# Patient Record
Sex: FEMALE | Race: BLACK OR AFRICAN AMERICAN | NOT HISPANIC OR LATINO | Employment: UNEMPLOYED | URBAN - METROPOLITAN AREA
[De-identification: names, ages, dates, MRNs, and addresses within clinical notes are randomized per-mention and may not be internally consistent; named-entity substitution may affect disease eponyms.]

---

## 2021-07-10 ENCOUNTER — HOSPITAL ENCOUNTER (EMERGENCY)
Age: 26
Discharge: HOME | End: 2021-07-10
Attending: EMERGENCY MEDICINE

## 2021-07-10 ENCOUNTER — APPOINTMENT (OUTPATIENT)
Dept: GENERAL RADIOLOGY | Age: 26
End: 2021-07-10
Attending: EMERGENCY MEDICINE

## 2021-07-10 VITALS
OXYGEN SATURATION: 100 % | WEIGHT: 121.25 LBS | SYSTOLIC BLOOD PRESSURE: 122 MMHG | DIASTOLIC BLOOD PRESSURE: 83 MMHG | RESPIRATION RATE: 16 BRPM | BODY MASS INDEX: 17.96 KG/M2 | HEIGHT: 69 IN | TEMPERATURE: 98.4 F | HEART RATE: 83 BPM
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
## 2021-07-10 PROCEDURE — 72100 X-RAY EXAM L-S SPINE 2/3 VWS: CPT

## 2021-07-10 PROCEDURE — 99283 EMERGENCY DEPT VISIT LOW MDM: CPT

## 2021-07-10 PROCEDURE — 10004651 HB RX, NO CHARGE ITEM: Performed by: EMERGENCY MEDICINE

## 2021-07-10 PROCEDURE — 99283 EMERGENCY DEPT VISIT LOW MDM: CPT | Performed by: EMERGENCY MEDICINE

## 2021-07-10 RX ORDER — ACETAMINOPHEN 325 MG/1
975 TABLET ORAL ONCE
Status: COMPLETED | OUTPATIENT
Start: 2021-07-10 | End: 2021-07-10

## 2021-07-10 RX ADMIN — ACETAMINOPHEN 975 MG: 325 TABLET ORAL at 11:36

## 2021-07-10 ASSESSMENT — ENCOUNTER SYMPTOMS
COUGH: 0
SHORTNESS OF BREATH: 0
NAUSEA: 0
FEVER: 0
RHINORRHEA: 0
ABDOMINAL PAIN: 0
HEADACHES: 0
CHILLS: 0
LIGHT-HEADEDNESS: 0
DIARRHEA: 0
EYE PAIN: 0
FATIGUE: 0
CONSTIPATION: 0
BACK PAIN: 1
VOMITING: 0
DIZZINESS: 0
NUMBNESS: 0
SORE THROAT: 0
WEAKNESS: 0

## 2021-07-10 ASSESSMENT — PAIN DESCRIPTION - PAIN TYPE
TYPE: ACUTE PAIN

## 2021-07-10 ASSESSMENT — PAIN SCALES - GENERAL
PAINLEVEL_OUTOF10: 6
PAINLEVEL_OUTOF10: 5
PAINLEVEL_OUTOF10: 6
PAINLEVEL_OUTOF10: 5

## 2024-08-20 VITALS
WEIGHT: 134 LBS | OXYGEN SATURATION: 100 % | SYSTOLIC BLOOD PRESSURE: 148 MMHG | RESPIRATION RATE: 12 BRPM | BODY MASS INDEX: 18.76 KG/M2 | HEIGHT: 71 IN | HEART RATE: 98 BPM | DIASTOLIC BLOOD PRESSURE: 87 MMHG | TEMPERATURE: 98 F

## 2024-08-21 ENCOUNTER — HOSPITAL ENCOUNTER (EMERGENCY)
Facility: HOSPITAL | Age: 29
Discharge: LEFT BEFORE DISCHARGE | End: 2024-08-21

## 2024-08-21 NOTE — ED INITIAL ASSESSMENT (HPI)
Patient states she was driving home from school last week and developed left side of body numbness and panic attack. Happened again today when leaving school. Ambulatory. Very anxious in triage.

## 2024-08-24 ENCOUNTER — HOSPITAL ENCOUNTER (EMERGENCY)
Facility: HOSPITAL | Age: 29
Discharge: HOME | End: 2024-08-24

## 2024-08-24 ENCOUNTER — APPOINTMENT (OUTPATIENT)
Dept: GENERAL RADIOLOGY | Facility: HOSPITAL | Age: 29
End: 2024-08-24

## 2024-08-24 VITALS
WEIGHT: 130 LBS | HEIGHT: 71 IN | BODY MASS INDEX: 18.2 KG/M2 | OXYGEN SATURATION: 99 % | HEART RATE: 96 BPM | RESPIRATION RATE: 18 BRPM | DIASTOLIC BLOOD PRESSURE: 74 MMHG | TEMPERATURE: 97 F | SYSTOLIC BLOOD PRESSURE: 117 MMHG

## 2024-08-24 PROCEDURE — 93005 ELECTROCARDIOGRAM TRACING: CPT

## 2024-08-25 LAB
ATRIAL RATE: 92 BPM
P AXIS: 77 DEGREES
P-R INTERVAL: 136 MS
Q-T INTERVAL: 352 MS
QRS DURATION: 76 MS
QTC CALCULATION (BEZET): 435 MS
R AXIS: 45 DEGREES
T AXIS: 23 DEGREES
VENTRICULAR RATE: 92 BPM

## 2024-08-25 NOTE — ED INITIAL ASSESSMENT (HPI)
Patient with cp that radiates to left arm x4 days. Denies injury, patient also reports dizziness and nausea while waiting for assessment. Denies CHONG or fevers. Patient ambulatory to assessment with steady gait.

## 2025-03-29 ENCOUNTER — HOSPITAL ENCOUNTER (EMERGENCY)
Facility: HOSPITAL | Age: 30
Discharge: HOME | End: 2025-03-29
Attending: EMERGENCY MEDICINE

## 2025-03-29 VITALS
SYSTOLIC BLOOD PRESSURE: 123 MMHG | TEMPERATURE: 98 F | RESPIRATION RATE: 18 BRPM | DIASTOLIC BLOOD PRESSURE: 83 MMHG | OXYGEN SATURATION: 97 % | HEART RATE: 97 BPM

## 2025-03-29 DIAGNOSIS — R42 DIZZINESS: Primary | ICD-10-CM

## 2025-03-29 LAB
ALBUMIN SERPL-MCNC: 4.7 G/DL (ref 3.2–4.8)
ALP LIVER SERPL-CCNC: 72 U/L
ALT SERPL-CCNC: 14 U/L
ANION GAP SERPL CALC-SCNC: 8 MMOL/L (ref 0–18)
AST SERPL-CCNC: 19 U/L (ref ?–34)
B-HCG UR QL: NEGATIVE
BASOPHILS # BLD AUTO: 0.03 X10(3) UL (ref 0–0.2)
BASOPHILS NFR BLD AUTO: 0.6 %
BILIRUB DIRECT SERPL-MCNC: 0.2 MG/DL (ref ?–0.3)
BILIRUB SERPL-MCNC: 0.5 MG/DL (ref 0.3–1.2)
BUN BLD-MCNC: 13 MG/DL (ref 9–23)
BUN/CREAT SERPL: 14.3 (ref 10–20)
CALCIUM BLD-MCNC: 9.7 MG/DL (ref 8.7–10.4)
CHLORIDE SERPL-SCNC: 105 MMOL/L (ref 98–112)
CO2 SERPL-SCNC: 26 MMOL/L (ref 21–32)
CREAT BLD-MCNC: 0.91 MG/DL
DEPRECATED RDW RBC AUTO: 38.6 FL (ref 35.1–46.3)
EGFRCR SERPLBLD CKD-EPI 2021: 89 ML/MIN/1.73M2 (ref 60–?)
EOSINOPHIL # BLD AUTO: 0.12 X10(3) UL (ref 0–0.7)
EOSINOPHIL NFR BLD AUTO: 2.5 %
ERYTHROCYTE [DISTWIDTH] IN BLOOD BY AUTOMATED COUNT: 11.9 % (ref 11–15)
FLUAV + FLUBV RNA SPEC NAA+PROBE: NEGATIVE
FLUAV + FLUBV RNA SPEC NAA+PROBE: NEGATIVE
GLUCOSE BLD-MCNC: 94 MG/DL (ref 70–99)
HCT VFR BLD AUTO: 38.8 %
HGB BLD-MCNC: 13.1 G/DL
IMM GRANULOCYTES # BLD AUTO: 0.01 X10(3) UL (ref 0–1)
IMM GRANULOCYTES NFR BLD: 0.2 %
LYMPHOCYTES # BLD AUTO: 1.53 X10(3) UL (ref 1–4)
LYMPHOCYTES NFR BLD AUTO: 31.7 %
MCH RBC QN AUTO: 30.2 PG (ref 26–34)
MCHC RBC AUTO-ENTMCNC: 33.8 G/DL (ref 31–37)
MCV RBC AUTO: 89.4 FL
MONOCYTES # BLD AUTO: 0.33 X10(3) UL (ref 0.1–1)
MONOCYTES NFR BLD AUTO: 6.8 %
NEUTROPHILS # BLD AUTO: 2.8 X10 (3) UL (ref 1.5–7.7)
NEUTROPHILS # BLD AUTO: 2.8 X10(3) UL (ref 1.5–7.7)
NEUTROPHILS NFR BLD AUTO: 58.2 %
OSMOLALITY SERPL CALC.SUM OF ELEC: 288 MOSM/KG (ref 275–295)
PLATELET # BLD AUTO: 206 10(3)UL (ref 150–450)
POTASSIUM SERPL-SCNC: 3.9 MMOL/L (ref 3.5–5.1)
PROT SERPL-MCNC: 7.7 G/DL (ref 5.7–8.2)
RBC # BLD AUTO: 4.34 X10(6)UL
RSV RNA SPEC NAA+PROBE: NEGATIVE
SARS-COV-2 RNA RESP QL NAA+PROBE: NOT DETECTED
SODIUM SERPL-SCNC: 139 MMOL/L (ref 136–145)
TSI SER-ACNC: 1.51 UIU/ML (ref 0.55–4.78)
WBC # BLD AUTO: 4.8 X10(3) UL (ref 4–11)

## 2025-03-29 PROCEDURE — 36415 COLL VENOUS BLD VENIPUNCTURE: CPT

## 2025-03-29 PROCEDURE — 84443 ASSAY THYROID STIM HORMONE: CPT | Performed by: EMERGENCY MEDICINE

## 2025-03-29 PROCEDURE — 81025 URINE PREGNANCY TEST: CPT

## 2025-03-29 PROCEDURE — 87430 STREP A AG IA: CPT | Performed by: EMERGENCY MEDICINE

## 2025-03-29 PROCEDURE — 99284 EMERGENCY DEPT VISIT MOD MDM: CPT

## 2025-03-29 PROCEDURE — 80076 HEPATIC FUNCTION PANEL: CPT | Performed by: EMERGENCY MEDICINE

## 2025-03-29 PROCEDURE — 87430 STREP A AG IA: CPT

## 2025-03-29 PROCEDURE — 80048 BASIC METABOLIC PNL TOTAL CA: CPT | Performed by: EMERGENCY MEDICINE

## 2025-03-29 PROCEDURE — 0241U SARS-COV-2/FLU A AND B/RSV BY PCR (GENEXPERT): CPT

## 2025-03-29 PROCEDURE — 99283 EMERGENCY DEPT VISIT LOW MDM: CPT

## 2025-03-29 PROCEDURE — 0241U SARS-COV-2/FLU A AND B/RSV BY PCR (GENEXPERT): CPT | Performed by: EMERGENCY MEDICINE

## 2025-03-29 PROCEDURE — 85025 COMPLETE CBC W/AUTO DIFF WBC: CPT | Performed by: EMERGENCY MEDICINE

## 2025-03-29 RX ORDER — MECLIZINE HYDROCHLORIDE 25 MG/1
25 TABLET ORAL 3 TIMES DAILY PRN
Qty: 15 TABLET | Refills: 0 | Status: SHIPPED | OUTPATIENT
Start: 2025-03-29 | End: 2025-04-03

## 2025-03-29 RX ORDER — MECLIZINE HYDROCHLORIDE 25 MG/1
25 TABLET ORAL ONCE
Status: COMPLETED | OUTPATIENT
Start: 2025-03-29 | End: 2025-03-29

## 2025-03-29 NOTE — ED INITIAL ASSESSMENT (HPI)
Pt to ED for c/o bilateral ear fullness with pain, sore throat pain, and dizziness since Wednesday.

## 2025-03-29 NOTE — ED PROVIDER NOTES
Patient Seen in: [facility] Emergency Department      History   No chief complaint on file.    Stated Complaint: Sore throat    Subjective:   29-year-old female with depression who had a normal menstrual cycle last week states for 3 days she is giving me a chief complaint of dizziness which she describes as things moving and making her unsteady.  This even gets bad when she is driving or standing.  She also has fullness in her bilateral ears.  She also mention when she yawns she feels throat pain but has no constant throat pain.  She denies URI symptoms such as runny nose and cough.  No fever or chills.  No headache.  No focal weakness or numbness.  No vomiting or diarrhea.  No nausea.  No dysuria.              Objective:     Past Medical History:    Depression              History reviewed. No pertinent surgical history.             Social History     Socioeconomic History    Marital status: Single   Tobacco Use    Smoking status: Never    Smokeless tobacco: Never   Vaping Use    Vaping status: Never Used   Substance and Sexual Activity    Alcohol use: Not Currently    Drug use: Never                  Physical Exam     ED Triage Vitals [03/29/25 1250]   /83   Pulse 97   Resp 18   Temp 98.3 °F (36.8 °C)   Temp src Oral   SpO2 97 %   O2 Device None (Room air)       Current Vitals:   Vital Signs  BP: 123/83  Pulse: 97  Resp: 18  Temp: 98.3 °F (36.8 °C)  Temp src: Oral    Oxygen Therapy  SpO2: 97 %  O2 Device: None (Room air)        Physical Exam  HENT:      Head: Normocephalic.      Right Ear: Tympanic membrane normal.      Left Ear: Tympanic membrane normal.      Nose: Nose normal.      Mouth/Throat:      Mouth: Mucous membranes are moist.      Pharynx: Oropharynx is clear.   Eyes:      Extraocular Movements: Extraocular movements intact.      Pupils: Pupils are equal, round, and reactive to light.   Cardiovascular:      Rate and Rhythm: Normal rate and regular rhythm.      Pulses: Normal pulses.       Heart sounds: Normal heart sounds.   Pulmonary:      Effort: Pulmonary effort is normal.      Breath sounds: Normal breath sounds.   Abdominal:      Palpations: Abdomen is soft.      Tenderness: There is no abdominal tenderness.   Musculoskeletal:         General: No swelling or tenderness. Normal range of motion.      Cervical back: Normal range of motion.   Lymphadenopathy:      Cervical: No cervical adenopathy.   Skin:     General: Skin is warm.      Capillary Refill: Capillary refill takes less than 2 seconds.   Neurological:      General: No focal deficit present.      Mental Status: She is alert and oriented to person, place, and time.      Cranial Nerves: No cranial nerve deficit.      Sensory: No sensory deficit.      Motor: No weakness.      Coordination: Coordination normal.      Gait: Gait normal.             ED Course     Labs Reviewed   HEPATIC FUNCTION PANEL (7) - Normal   BASIC METABOLIC PANEL (8) - Normal   TSH W REFLEX TO FREE T4 - Normal   POCT PREGNANCY URINE - Normal   SARS-COV-2/FLU A AND B/RSV BY PCR (GENEXPERT) - Normal    Narrative:     This test is intended for the qualitative detection and differentiation of SARS-CoV-2, influenza A, influenza B, and respiratory syncytial virus (RSV) viral RNA in nasopharyngeal or nares swabs from individuals suspected of respiratory viral infection consistent with COVID-19 by their healthcare provider. Signs and symptoms of respiratory viral infection due to SARS-CoV-2, influenza, and RSV can be similar.    Test performed using the Xpert Xpress SARS-CoV-2/FLU/RSV (real time RT-PCR)  assay on the GeneXpert instrument, [employer], [place] [address].   This test is being used under the Food and Drug Administration's Emergency Use Authorization.    The authorized Fact Sheet for Healthcare Providers for this assay is available upon request from the laboratory.   RAPID STREP A SCREEN (LC) - Normal    Narrative:     A confirmatory culture is recommended if  clinically indicated.   CBC WITH DIFFERENTIAL WITH PLATELET   BLOOD CULTURE       ED Course as of 03/30/25 1752  ------------------------------------------------------------  Time: 03/29 1413  Comment: I went to see the patient to tell her her pregnancy test was negative and her GEN expert was negative and then she says she is now willing to do some blood work but still does not want to do the MRI.  Meclizine was given.  Labs independently interpreted by me.  Pregnancy negative, Genex but negative, rapid strep negative.  She looks comfortable and has normal vital signs at this time.              MDM      Patient refused imaging        Medical Decision Making  Patient here with weird sensation of things moving and even said spinning at 1 point.  She has no otitis media.  No headache or focal deficit.  I did recommend basic labs and MRI to rule out stroke.  She said she has to  her son and is not sure she wants to wait around for testing.  I did recommend at least a pregnancy test and try some meclizine and she can come back later if she changes her mind or gets worse.  She is alert and oriented and decisional and understands she is going against my recommendations.  I said this 2 times to make sure she understood this.  Rapid strep was negative.  Waiting for GEN expert.    Amount and/or Complexity of Data Reviewed  Labs: ordered. Decision-making details documented in ED Course.  Discussion of management or test interpretation with external provider(s): And felt better after meclizine.  Understands imaging was recommended and will come back if she changes her mind.  Rapid strep negative and she only had the pain with yawning.  Good follow-up was given.  All laboratory studies independently interpreted by me    Risk  Prescription drug management.        Disposition and Plan     Clinical Impression:  1. Dizziness         Disposition:  Discharge  3/29/2025  3:37 pm    Follow-up:  Carmita Wren MD  [address] [facility] [address]  [place] [address]  [phone]    Follow up      Jj Lisa MD  [address] [facility] [address]  [facility] [id number] [phone]    Follow up            Medications Prescribed:  Discharge Medication List as of 3/29/2025  3:40 PM        START taking these medications    Details   meclizine 25 MG Oral Tab Take 1 tablet (25 mg total) by mouth 3 (three) times daily as needed., Normal, Disp-15 tablet, R-0                 Supplementary Documentation:

## 2025-03-29 NOTE — DISCHARGE INSTRUCTIONS
If you change your mind about getting the MRI return to the ER immediately.  Otherwise follow-up with the neurology clinic.  Return for changes or worsening.  Take meclizine as needed.  Follow-up with neurology clinic.  Follow-up with the primary doctor or your own for reevaluation.

## 2025-03-31 ENCOUNTER — HOSPITAL ENCOUNTER (EMERGENCY)
Facility: HOSPITAL | Age: 30
Discharge: HOME | End: 2025-03-31
Attending: EMERGENCY MEDICINE

## 2025-03-31 VITALS
HEART RATE: 92 BPM | BODY MASS INDEX: 18 KG/M2 | SYSTOLIC BLOOD PRESSURE: 130 MMHG | TEMPERATURE: 98 F | OXYGEN SATURATION: 100 % | RESPIRATION RATE: 18 BRPM | WEIGHT: 130 LBS | DIASTOLIC BLOOD PRESSURE: 87 MMHG

## 2025-03-31 DIAGNOSIS — H83.02 LABYRINTHITIS OF LEFT EAR: Primary | ICD-10-CM

## 2025-03-31 DIAGNOSIS — H65.02 NON-RECURRENT ACUTE SEROUS OTITIS MEDIA OF LEFT EAR: ICD-10-CM

## 2025-03-31 PROCEDURE — 99283 EMERGENCY DEPT VISIT LOW MDM: CPT

## 2025-03-31 RX ORDER — MECLIZINE HYDROCHLORIDE 25 MG/1
25 TABLET ORAL 3 TIMES DAILY PRN
Qty: 20 TABLET | Refills: 0 | Status: SHIPPED | OUTPATIENT
Start: 2025-03-31

## 2025-03-31 RX ORDER — PSEUDOEPHEDRINE HCL 30 MG/1
60 TABLET, FILM COATED ORAL EVERY 6 HOURS PRN
Qty: 36 TABLET | Refills: 0 | Status: SHIPPED | OUTPATIENT
Start: 2025-03-31 | End: 2025-04-30

## 2025-03-31 NOTE — ED INITIAL ASSESSMENT (HPI)
29YF c/c of ear pain Pt state that she having bilateral; ear pain with the L ear pain, sore throat, a scab in her nose and dizziness

## 2025-03-31 NOTE — ED PROVIDER NOTES
Patient Seen in: [facility] Emergency Department      History     Chief Complaint   Patient presents with    Ear Problem Pain     Stated Complaint: L ear pain    Subjective:     HPI    29-year-old healthy woman reports dizziness since Wednesday, March 26, 2025, described as a spinning sensation similar to vertigo. Symptoms improve when grounded. No flu-like symptoms such as muscle or joint aches. No high blood pressure. Had a sinus infection a month ago, which has since resolved.    Objective:   Past Medical History:    Depression              History reviewed. No pertinent surgical history.             No pertinent social history.            Review of Systems    Positive for stated complaint: L ear pain  Other systems are as noted in HPI.  Constitutional and vital signs reviewed.      All other systems reviewed and negative except as noted above.    Physical Exam     ED Triage Vitals [03/31/25 1523]   /86   Pulse 96   Resp 18   Temp 98.1 °F (36.7 °C)   Temp src Temporal   SpO2 98 %   O2 Device None (Room air)       Current:/86   Pulse 92   Temp 98.1 °F (36.7 °C) (Temporal)   Resp 18   Wt 59 kg   LMP 03/21/2025   SpO2 100%   BMI 18.13 kg/m²     General:  Vitals as listed.  No acute distress   HEENT: Sclerae anicteric.  Conjunctivae show no pallor.  Oropharynx with minimal erythema, no tonsillar swelling, no exudate. mucous membranes moist.  EACs clear.  TMs dull in appearance without erythema  Lungs: good air exchange  Extremities: no edema, normal peripheral pulses   Neuro: Alert oriented and nonfocal    ED COURSE and MDM     To use Sudafed and Antivert as well as Tylenol/ibuprofen for discomfort    I have discussed with the patient the results of testing, differential diagnosis, and treatment plan. They expressed clear understanding of these instructions and agrees to the plan provided.    Disposition and Plan     Clinical Impression:  1. Labyrinthitis of left ear    2. Non-recurrent  acute serous otitis media of left ear         Disposition:  Discharge  3/31/2025  5:04 pm    Follow-up:  Shirley Molina MD  [address] [facility] [address]  [phone]    Follow up in 1 week(s)  As needed        Medications Prescribed:  Current Discharge Medication List        START taking these medications    Details   pseudoephedrine 30 MG Oral Tab Take 2 tablets (60 mg total) by mouth every 6 (six) hours as needed for congestion.  Qty: 36 tablet, Refills: 0    Associated Diagnoses: Labyrinthitis of left ear      !! meclizine 25 MG Oral Tab Take 1 tablet (25 mg total) by mouth 3 (three) times daily as needed.  Qty: 20 tablet, Refills: 0    Associated Diagnoses: Labyrinthitis of left ear       !! - Potential duplicate medications found. Please discuss with provider.

## 2025-03-31 NOTE — DISCHARGE INSTRUCTIONS
Take 1000 mg acetaminophen (2 Tylenol tablets) and/or 600 mg ibuprofen (3 Advil tablets) every 6 hours as needed

## 2025-04-05 ENCOUNTER — HOSPITAL ENCOUNTER (EMERGENCY)
Facility: HOSPITAL | Age: 30
Discharge: HOME | End: 2025-04-05
Attending: EMERGENCY MEDICINE

## 2025-04-05 ENCOUNTER — APPOINTMENT (OUTPATIENT)
Dept: CT IMAGING | Facility: HOSPITAL | Age: 30
End: 2025-04-05
Attending: EMERGENCY MEDICINE

## 2025-04-05 VITALS
RESPIRATION RATE: 16 BRPM | BODY MASS INDEX: 18.61 KG/M2 | OXYGEN SATURATION: 99 % | WEIGHT: 130 LBS | TEMPERATURE: 98 F | HEIGHT: 70 IN | DIASTOLIC BLOOD PRESSURE: 83 MMHG | SYSTOLIC BLOOD PRESSURE: 113 MMHG | HEART RATE: 76 BPM

## 2025-04-05 DIAGNOSIS — U07.1 COVID-19: Primary | ICD-10-CM

## 2025-04-05 DIAGNOSIS — H83.03 LABYRINTHITIS OF BOTH EARS: ICD-10-CM

## 2025-04-05 LAB
ALBUMIN SERPL-MCNC: 4.7 G/DL (ref 3.2–4.8)
ALBUMIN/GLOB SERPL: 1.6 {RATIO} (ref 1–2)
ALP LIVER SERPL-CCNC: 63 U/L
ALT SERPL-CCNC: 13 U/L
ANION GAP SERPL CALC-SCNC: 7 MMOL/L (ref 0–18)
AST SERPL-CCNC: 28 U/L (ref ?–34)
B-HCG UR QL: NEGATIVE
BASOPHILS # BLD AUTO: 0.02 X10(3) UL (ref 0–0.2)
BASOPHILS NFR BLD AUTO: 0.5 %
BILIRUB SERPL-MCNC: 0.4 MG/DL (ref 0.3–1.2)
BILIRUB UR QL STRIP.AUTO: NEGATIVE
BUN BLD-MCNC: 12 MG/DL (ref 9–23)
CALCIUM BLD-MCNC: 9.7 MG/DL (ref 8.7–10.6)
CHLORIDE SERPL-SCNC: 105 MMOL/L (ref 98–112)
CLARITY UR REFRACT.AUTO: CLEAR
CO2 SERPL-SCNC: 28 MMOL/L (ref 21–32)
COLOR UR AUTO: YELLOW
CREAT BLD-MCNC: 0.98 MG/DL
EGFRCR SERPLBLD CKD-EPI 2021: 81 ML/MIN/1.73M2 (ref 60–?)
EOSINOPHIL # BLD AUTO: 0.04 X10(3) UL (ref 0–0.7)
EOSINOPHIL NFR BLD AUTO: 1 %
ERYTHROCYTE [DISTWIDTH] IN BLOOD BY AUTOMATED COUNT: 11.7 %
FLUAV + FLUBV RNA SPEC NAA+PROBE: NEGATIVE
FLUAV + FLUBV RNA SPEC NAA+PROBE: NEGATIVE
GLOBULIN PLAS-MCNC: 3 G/DL (ref 2–3.5)
GLUCOSE BLD-MCNC: 91 MG/DL (ref 70–99)
GLUCOSE UR STRIP.AUTO-MCNC: NEGATIVE MG/DL
HCT VFR BLD AUTO: 37.2 %
HGB BLD-MCNC: 13.2 G/DL
IMM GRANULOCYTES # BLD AUTO: 0.01 X10(3) UL (ref 0–1)
IMM GRANULOCYTES NFR BLD: 0.2 %
KETONES UR STRIP.AUTO-MCNC: 15 MG/DL
LEUKOCYTE ESTERASE UR QL STRIP.AUTO: NEGATIVE
LYMPHOCYTES # BLD AUTO: 2.08 X10(3) UL (ref 1–4)
LYMPHOCYTES NFR BLD AUTO: 49.5 %
MCH RBC QN AUTO: 30.8 PG (ref 26–34)
MCHC RBC AUTO-ENTMCNC: 35.5 G/DL (ref 31–37)
MCV RBC AUTO: 86.7 FL
MONOCYTES # BLD AUTO: 0.43 X10(3) UL (ref 0.1–1)
MONOCYTES NFR BLD AUTO: 10.2 %
NEUTROPHILS # BLD AUTO: 1.62 X10 (3) UL (ref 1.5–7.7)
NEUTROPHILS # BLD AUTO: 1.62 X10(3) UL (ref 1.5–7.7)
NEUTROPHILS NFR BLD AUTO: 38.6 %
NITRITE UR QL STRIP.AUTO: NEGATIVE
OSMOLALITY SERPL CALC.SUM OF ELEC: 289 MOSM/KG (ref 275–295)
PH UR STRIP.AUTO: 7 [PH] (ref 5–8)
PLATELET # BLD AUTO: 171 10(3)UL (ref 150–450)
POTASSIUM SERPL-SCNC: 3.9 MMOL/L (ref 3.5–5.1)
PROT SERPL-MCNC: 7.7 G/DL (ref 5.7–8.2)
RBC # BLD AUTO: 4.29 X10(6)UL
RBC UR QL AUTO: NEGATIVE
RSV RNA SPEC NAA+PROBE: NEGATIVE
SARS-COV-2 RNA RESP QL NAA+PROBE: DETECTED
SODIUM SERPL-SCNC: 140 MMOL/L (ref 136–145)
SP GR UR STRIP.AUTO: 1.02 (ref 1–1.03)
UROBILINOGEN UR STRIP.AUTO-MCNC: 0.2 MG/DL
WBC # BLD AUTO: 4.2 X10(3) UL (ref 4–11)

## 2025-04-05 PROCEDURE — 81025 URINE PREGNANCY TEST: CPT

## 2025-04-05 PROCEDURE — 70450 CT HEAD/BRAIN W/O DYE: CPT | Performed by: EMERGENCY MEDICINE

## 2025-04-05 PROCEDURE — 85025 COMPLETE CBC W/AUTO DIFF WBC: CPT | Performed by: EMERGENCY MEDICINE

## 2025-04-05 PROCEDURE — 99284 EMERGENCY DEPT VISIT MOD MDM: CPT

## 2025-04-05 PROCEDURE — 81003 URINALYSIS AUTO W/O SCOPE: CPT | Performed by: EMERGENCY MEDICINE

## 2025-04-05 PROCEDURE — 96361 HYDRATE IV INFUSION ADD-ON: CPT

## 2025-04-05 PROCEDURE — 0241U SARS-COV-2/FLU A AND B/RSV BY PCR (GENEXPERT): CPT | Performed by: EMERGENCY MEDICINE

## 2025-04-05 PROCEDURE — 96374 THER/PROPH/DIAG INJ IV PUSH: CPT

## 2025-04-05 PROCEDURE — 80053 COMPREHEN METABOLIC PANEL: CPT | Performed by: EMERGENCY MEDICINE

## 2025-04-05 RX ORDER — PREDNISONE 20 MG/1
20 TABLET ORAL DAILY
Qty: 5 TABLET | Refills: 0 | Status: SHIPPED | OUTPATIENT
Start: 2025-04-05 | End: 2025-04-10

## 2025-04-05 RX ORDER — KETOROLAC TROMETHAMINE 15 MG/ML
15 INJECTION, SOLUTION INTRAMUSCULAR; INTRAVENOUS ONCE
Status: COMPLETED | OUTPATIENT
Start: 2025-04-05 | End: 2025-04-05

## 2025-04-06 NOTE — ED INITIAL ASSESSMENT (HPI)
Recently dx with ear fluids to L ear with dizziness. Started to developed headche while working on cols room.  + nasea & feeling chest congested

## 2025-04-06 NOTE — ED PROVIDER NOTES
Patient Seen in: [facility] Emergency Department      History     Chief Complaint   Patient presents with    Headache     Stated Complaint: DIZZINESS X 3 DAYS WITH HEADACHE    Subjective:   HPI      Patient presents with headache and dizziness.  The patient states that the symptoms started last Wednesday.  She felt dizzy but had not eaten.  She did notice also that her ears felt full.  She was seen here on Monday and diagnosed with labyrinthitis.  She was given meclizine and told to take decongestants.  She felt like those meds did not really help.  She went to work on Tuesday and got a bad headache.  She states it sort of like a sinus headache but also feels like a band around her head.  It is worse with bending over.  She has had some chills, congestion and occasional cough.  She still getting the dizziness.  The headache is not as bad currently.  She has not been taking any medication for pain.    Objective:     Past Medical History:    Depression              History reviewed. No pertinent surgical history.             Social History     Socioeconomic History    Marital status: Single   Tobacco Use    Smoking status: Never    Smokeless tobacco: Never   Vaping Use    Vaping status: Never Used   Substance and Sexual Activity    Alcohol use: Not Currently    Drug use: Never                  Physical Exam     ED Triage Vitals [04/05/25 1945]   /83   Pulse 76   Resp 16   Temp 98.3 °F (36.8 °C)   Temp src Oral   SpO2 99 %   O2 Device None (Room air)       Current Vitals:   Vital Signs  BP: 113/83  Pulse: 76  Resp: 16  Temp: 98.3 °F (36.8 °C)  Temp src: Oral    Oxygen Therapy  SpO2: 99 %  O2 Device: None (Room air)        Physical Exam  General: Alert and oriented x3 in no acute distress.  HEENT: Normocephalic, atraumatic, pupils equal round and reactive to light, EOMI, no nystagmus, TM effusions without erythema bilaterally, oropharynx clear, uvula midline.  Neck: Supple.  Cardiovascular: Regular rate and  rhythm.  Respiratory: Lungs clear to auscultation.  Extremities: No CCE.  Skin: Warm and dry.  Neurologic: Cranial nerves intact.  Strength 5/5 in all extremities.  Sensory exam grossly intact.    ED Course     Labs Reviewed   URINALYSIS, ROUTINE - Abnormal; Notable for the following components:       Result Value    Ketones Urine 15 (*)     Protein Urine Trace (*)     All other components within normal limits   SARS-COV-2/FLU A AND B/RSV BY PCR (GENEXPERT) - Abnormal; Notable for the following components:    SARS-CoV-2 (COVID-19) - (GeneXpert) Detected (*)     All other components within normal limits    Narrative:     This test is intended for the qualitative detection and differentiation of SARS-CoV-2, influenza A, influenza B, and respiratory syncytial virus (RSV) viral RNA in nasopharyngeal or nares swabs from individuals suspected of respiratory viral infection consistent with COVID-19 by their healthcare provider. Signs and symptoms of respiratory viral infection due to SARS-CoV-2, influenza, and RSV can be similar.    Test performed using the Xpert Xpress SARS-CoV-2/FLU/RSV (real time RT-PCR)  assay on the GeneXpert instrument, [employer], [place] [address].   This test is being used under the Food and Drug Administration's Emergency Use Authorization.    The authorized Fact Sheet for Healthcare Providers for this assay is available upon request from the laboratory.   COMP METABOLIC PANEL (14) - Normal   POCT PREGNANCY URINE - Normal   CBC WITH DIFFERENTIAL WITH PLATELET     CT BRAIN OR HEAD (CPT=70450)    Result Date: 4/5/2025  PROCEDURE:  CT BRAIN OR HEAD (42369)  COMPARISON:  None.  INDICATIONS:  DIZZINESS X 3 DAYS WITH SEVERE HEADACHE  TECHNIQUE:  Noncontrast CT scanning is performed through the brain. Dose reduction techniques were used. Dose information is transmitted to the ACR (American College of Radiology) NRDR (National Radiology Data Registry) which includes the Dose Index Registry.  PATIENT  STATED HISTORY: (As transcribed by Technologist)   DIZZINESS X 3 DAYS WITH SEVERE HEADACHE     FINDINGS:  VENTRICLES/SULCI:  Ventricles and sulci are normal in size.  INTRACRANIAL:  There are no abnormal extraaxial fluid collections.  There is no midline shift.  There are no intraparenchymal brain abnormalities.  There is nothing specific for acute infarct.  There is no hemorrhage or mass lesion.  SINUSES:           No sign of acute sinusitis.  MASTOIDS:          No sign of acute inflammation. SKULL:             No evidence for fracture or osseous abnormality. OTHER:             None.            CONCLUSION:  No acute intracranial abnormality.    LOCATION:  [facility]   Dictated by (CST): Guido Rust MD on 4/05/2025 at 9:34 PM     Finalized by (CST): Guido Rust MD on 4/05/2025 at 9:35 PM             Medications   ketorolac (Toradol) 15 MG/ML injection 15 mg (15 mg Intravenous Given 4/5/25 2102)   sodium chloride 0.9 % IV bolus 1,000 mL (0 mL Intravenous Stopped 4/5/25 2201)     The patient was given Toradol and IV fluids for her pain.       MDM      Patient presents with headache, congestion and dizziness.  Differential diagnosis includes but is not limited to intracranial hemorrhage, acute sinusitis, viral URI and labyrinthitis.  The patient's CT does not show any acute pathology.  She has tested positive for COVID-19.  Her CBC and chemistry panels are unremarkable.  She has an intact neurologic exam.  She does seem to have some symptoms of labyrinthitis.  She was counseled regarding ongoing supportive care measures and will be given a small dose of steroids to see if that will help her symptoms as well.  She is 10 days into her course.  She was counseled regarding follow-up and symptoms to return for.        Medical Decision Making      Disposition and Plan     Clinical Impression:  1. COVID-19    2. Labyrinthitis of both ears         Disposition:  Discharge  4/5/2025  9:47 pm    Follow-up:  Brianna Toledo  [facility] [address]  [phone]    Schedule an appointment as soon as possible for a visit  As needed          Medications Prescribed:  Discharge Medication List as of 4/5/2025 10:06 PM        START taking these medications    Details   predniSONE 20 MG Oral Tab Take 1 tablet (20 mg total) by mouth daily for 5 days., Normal, Disp-5 tablet, R-0                 Supplementary Documentation:

## (undated) NOTE — LETTER
March 31, 2025    Patient: Sissy Coto   Date of Visit: 3/31/2025       To Whom It May Concern:    Sissy Coto was seen and treated in our emergency department on 3/31/2025. She should not return to work until 4/2/25 .    If you have any questions or concerns, please don't hesitate to call.       Encounter Provider(s):    David Cole MD